# Patient Record
Sex: FEMALE | Race: WHITE | NOT HISPANIC OR LATINO | Employment: OTHER | RURAL
[De-identification: names, ages, dates, MRNs, and addresses within clinical notes are randomized per-mention and may not be internally consistent; named-entity substitution may affect disease eponyms.]

---

## 2024-02-25 ENCOUNTER — HOSPITAL ENCOUNTER (EMERGENCY)
Facility: HOSPITAL | Age: 54
Discharge: HOME | End: 2024-02-25
Payer: COMMERCIAL

## 2024-02-25 VITALS
OXYGEN SATURATION: 94 % | HEART RATE: 108 BPM | DIASTOLIC BLOOD PRESSURE: 77 MMHG | SYSTOLIC BLOOD PRESSURE: 126 MMHG | RESPIRATION RATE: 20 BRPM | TEMPERATURE: 98 F | HEIGHT: 67 IN

## 2024-02-25 DIAGNOSIS — R05.9 COUGH: ICD-10-CM

## 2024-02-25 DIAGNOSIS — J40 BRONCHITIS: Primary | ICD-10-CM

## 2024-02-25 LAB
ANION GAP SERPL CALCULATED.3IONS-SCNC: 8 MMOL/L (ref 7–16)
BASOPHILS # BLD AUTO: 0.04 K/UL (ref 0–0.2)
BASOPHILS NFR BLD AUTO: 0.5 % (ref 0–1)
BUN SERPL-MCNC: 17 MG/DL (ref 7–18)
BUN/CREAT SERPL: 14 (ref 6–20)
CALCIUM SERPL-MCNC: 9.5 MG/DL (ref 8.5–10.1)
CHLORIDE SERPL-SCNC: 106 MMOL/L (ref 98–107)
CO2 SERPL-SCNC: 32 MMOL/L (ref 21–32)
CREAT SERPL-MCNC: 1.25 MG/DL (ref 0.55–1.02)
DIFFERENTIAL METHOD BLD: ABNORMAL
EGFR (NO RACE VARIABLE) (RUSH/TITUS): 52 ML/MIN/1.73M2
EOSINOPHIL # BLD AUTO: 0.01 K/UL (ref 0–0.5)
EOSINOPHIL NFR BLD AUTO: 0.1 % (ref 1–4)
ERYTHROCYTE [DISTWIDTH] IN BLOOD BY AUTOMATED COUNT: 14.9 % (ref 11.5–14.5)
GLUCOSE SERPL-MCNC: 110 MG/DL (ref 74–106)
HCT VFR BLD AUTO: 42.9 % (ref 38–47)
HGB BLD-MCNC: 13.8 G/DL (ref 12–16)
IMM GRANULOCYTES # BLD AUTO: 0.03 K/UL (ref 0–0.04)
IMM GRANULOCYTES NFR BLD: 0.4 % (ref 0–0.4)
LYMPHOCYTES # BLD AUTO: 0.89 K/UL (ref 1–4.8)
LYMPHOCYTES NFR BLD AUTO: 10.9 % (ref 27–41)
MCH RBC QN AUTO: 29.1 PG (ref 27–31)
MCHC RBC AUTO-ENTMCNC: 32.2 G/DL (ref 32–36)
MCV RBC AUTO: 90.3 FL (ref 80–96)
MONOCYTES # BLD AUTO: 0.67 K/UL (ref 0–0.8)
MONOCYTES NFR BLD AUTO: 8.2 % (ref 2–6)
MPC BLD CALC-MCNC: 10 FL (ref 9.4–12.4)
NEUTROPHILS # BLD AUTO: 6.54 K/UL (ref 1.8–7.7)
NEUTROPHILS NFR BLD AUTO: 79.9 % (ref 53–65)
NRBC # BLD AUTO: 0 X10E3/UL
NRBC, AUTO (.00): 0 %
PLATELET # BLD AUTO: 303 K/UL (ref 150–400)
POTASSIUM SERPL-SCNC: 4.5 MMOL/L (ref 3.5–5.1)
RBC # BLD AUTO: 4.75 M/UL (ref 4.2–5.4)
SODIUM SERPL-SCNC: 141 MMOL/L (ref 136–145)
WBC # BLD AUTO: 8.18 K/UL (ref 4.5–11)

## 2024-02-25 PROCEDURE — 99284 EMERGENCY DEPT VISIT MOD MDM: CPT | Mod: 25

## 2024-02-25 PROCEDURE — 80048 BASIC METABOLIC PNL TOTAL CA: CPT | Performed by: NURSE PRACTITIONER

## 2024-02-25 PROCEDURE — 85025 COMPLETE CBC W/AUTO DIFF WBC: CPT | Performed by: NURSE PRACTITIONER

## 2024-02-25 PROCEDURE — 99284 EMERGENCY DEPT VISIT MOD MDM: CPT | Mod: ,,, | Performed by: NURSE PRACTITIONER

## 2024-02-25 RX ORDER — PREDNISONE 10 MG/1
10 TABLET ORAL DAILY
Qty: 21 TABLET | Refills: 0 | Status: SHIPPED | OUTPATIENT
Start: 2024-02-25

## 2024-02-25 RX ORDER — PROMETHAZINE HYDROCHLORIDE AND DEXTROMETHORPHAN HYDROBROMIDE 6.25; 15 MG/5ML; MG/5ML
5 SYRUP ORAL EVERY 6 HOURS PRN
Qty: 180 ML | Refills: 0 | Status: SHIPPED | OUTPATIENT
Start: 2024-02-25 | End: 2024-03-06

## 2024-02-25 RX ORDER — ALBUTEROL SULFATE 90 UG/1
1-2 AEROSOL, METERED RESPIRATORY (INHALATION) EVERY 6 HOURS PRN
Qty: 18 G | Refills: 0 | Status: SHIPPED | OUTPATIENT
Start: 2024-02-25 | End: 2025-02-24

## 2024-02-25 NOTE — ED PROVIDER NOTES
Encounter Date: 2/25/2024       History     Chief Complaint   Patient presents with    Cough    Nausea    Fever     54 y/o WF presents to the emergency department with c/o fever and cough for the past 5 days. She reports she has been seen at [facility] twice. She states she was given a Decadron shot and Rocephin shot the first time. She followed up two days ago and states she had a CXR. She was then called in a prescription for Amoxil. She states she is on day 2 of this. She has also been taking Tessalon and Ed ahist with no relief. She reports subjective fevers and chills, T-max 102 at home. She states she has taken Tylenol and Ibuprofen which does help her fever but then it returns. She states she has had two COVID and Influenza test at [facility], both of which were negative. She states her cough is non productive. She states she comes to the emergency department d/t persistent coughing and just not feeling well. She states she does smoke about 4-5 cigarettes a day. She denies alcohol or illicit drug use. There are no particular exacerbating or remitting factors.     The history is provided by the patient.     Review of patient's allergies indicates:  No Known Allergies  No past medical history on file.  No past surgical history on file.  No family history on file.     Review of Systems   All other systems reviewed and are negative.      Physical Exam     Initial Vitals [02/25/24 1713]   BP Pulse Resp Temp SpO2   126/77 108 20 98.3 °F (36.8 °C) (!) 94 %      MAP       --         Physical Exam    Constitutional: She appears well-developed and well-nourished. She is cooperative.  Non-toxic appearance.   Cardiovascular:  Normal rate, regular rhythm, normal heart sounds and normal pulses.           Pulmonary/Chest: Effort normal and breath sounds normal.   Frequent cough   Abdominal: Abdomen is soft. Bowel sounds are normal. There is no abdominal tenderness.     Neurological: She is alert and oriented to person, place,  and time.   Skin: Skin is warm, dry and intact. Capillary refill takes less than 2 seconds.   Psychiatric: Her behavior is normal.         Medical Screening Exam   See Full Note    ED Course   Procedures  Labs Reviewed   BASIC METABOLIC PANEL - Abnormal; Notable for the following components:       Result Value    Glucose 110 (*)     Creatinine 1.25 (*)     eGFR 52 (*)     All other components within normal limits   CBC WITH DIFFERENTIAL - Abnormal; Notable for the following components:    RDW 14.9 (*)     Neutrophils % 79.9 (*)     Lymphocytes % 10.9 (*)     Monocytes % 8.2 (*)     Eosinophils % 0.1 (*)     Lymphocytes, Absolute 0.89 (*)     All other components within normal limits   CBC W/ AUTO DIFFERENTIAL    Narrative:     The following orders were created for panel order CBC auto differential.  Procedure                               Abnormality         Status                     ---------                               -----------         ------                     CBC with Differential[[id number]]       Abnormal            Final result                 Please view results for these tests on the individual orders.          Imaging Results              X-Ray Chest PA And Lateral (Final result)  Result time 02/25/24 18:06:43      Final result by Renny Jade MD (02/25/24 18:06:43)                   Impression:      No acute cardiopulmonary process.    Place of service: [facility]      Electronically signed by: Renny Jade  Date:    02/25/2024  Time:    18:06               Narrative:    EXAMINATION:  XR CHEST PA AND LATERAL    CLINICAL HISTORY:  Cough, unspecified    TECHNIQUE:  PA and lateral    COMPARISON:  None available    FINDINGS:  The cardiomediastinal silhouette is within normal limits. The lungs are clear. There is no pneumothorax or pleural effusion.    There is no acute osseous or soft tissue abnormality.                                       Medications - No data to  display  Medical Decision Making  52 y/o WF presents to the emergency department with c/o fever and cough for the past 5 days. She reports she has been seen at [facility] twice. She states she was given a Decadron shot and Rocephin shot the first time. She followed up two days ago and states she had a CXR. She was then called in a prescription for Amoxil. She states she is on day 2 of this. She has also been taking Tessalon and Ed ahist with no relief. She reports subjective fevers and chills, T-max 102 at home. She states she has taken Tylenol and Ibuprofen which does help her fever but then it returns. She states she has had two COVID and Influenza test at [facility], both of which were negative. She states her cough is non productive. She states she comes to the emergency department d/t persistent coughing and just not feeling well. She states she does smoke about 4-5 cigarettes a day. She denies alcohol or illicit drug use. There are no particular exacerbating or remitting factors.     Problems Addressed:  Bronchitis:     Details: Work up essentially unremarkable. Rx for Albuterol inhaler, Prednisone taper and cough medication. Instructed to continue all other medications as previously directed. Counseled on supportive measures. Follow up instructions given. Warning s/s discussed and return precautions given; the patient has v/u.    Amount and/or Complexity of Data Reviewed  Labs: ordered.  Radiology: ordered. Decision-making details documented in ED Course.    Risk  OTC drugs.  Prescription drug management.               ED Course as of 02/25/24 1855   Sun Feb 25, 2024 1812 BP: 126/77 [BM]   1812 Temp: 98.3 °F (36.8 °C) [BM]   1812 Pulse: 108 [BM]   1812 Resp: 20 [BM]   1812 SpO2(!): 94 % [BM]   1812 X-Ray Chest PA And Lateral  No acute cardiopulmonary process. [BM]      ED Course User Index  [BM] Shayy Bagley FNP                           Clinical Impression:   Final diagnoses:  [R05.9] Cough  [J40]  Bronchitis (Primary)        ED Disposition Condition    Discharge Stable          ED Prescriptions       Medication Sig Dispense Start Date End Date Auth. Provider    promethazine-dextromethorphan (PROMETHAZINE-DM) 6.25-15 mg/5 mL Syrp Take 5 mLs by mouth every 6 (six) hours as needed (cough). 180 mL 2/25/2024 3/6/2024 Shayy Bagley FNP    albuterol (PROVENTIL/VENTOLIN HFA) 90 mcg/actuation inhaler Inhale 1-2 puffs into the lungs every 6 (six) hours as needed for Wheezing. Rescue 18 g 2/25/2024 2/24/2025 Shayy Bagley FNP    predniSONE (DELTASONE) 10 MG tablet Take 1 tablet (10 mg total) by mouth once daily. Take 4 tabs x 3 days, then take 2 tabs x 3 days, then take 1 tab x 3 days. 21 tablet 2/25/2024 -- Shayy Bagley FNP          Follow-up Information       Follow up With Specialties Details Why Contact Info    Primary Care Provider   As needed              Shayy Bagley FNP  02/25/24 2928

## 2024-02-26 NOTE — DISCHARGE INSTRUCTIONS
Use prescriptions as directed. Alternate Tylenol and Ibuprofen as needed for pain/fever. Ensure you are drinking plenty of fluids, especially water. Follow up with your primary care provider if no improvement or otherwise as needed. Return to the ED for worsening signs and symptoms or otherwise as needed.